# Patient Record
Sex: FEMALE | Race: BLACK OR AFRICAN AMERICAN | NOT HISPANIC OR LATINO | Employment: PART TIME | URBAN - METROPOLITAN AREA
[De-identification: names, ages, dates, MRNs, and addresses within clinical notes are randomized per-mention and may not be internally consistent; named-entity substitution may affect disease eponyms.]

---

## 2024-09-09 ENCOUNTER — HOSPITAL ENCOUNTER (EMERGENCY)
Facility: HOSPITAL | Age: 49
Discharge: HOME | End: 2024-09-09
Attending: EMERGENCY MEDICINE | Admitting: EMERGENCY MEDICINE
Payer: MEDICAID

## 2024-09-09 VITALS
WEIGHT: 126 LBS | BODY MASS INDEX: 23.79 KG/M2 | HEART RATE: 75 BPM | OXYGEN SATURATION: 97 % | SYSTOLIC BLOOD PRESSURE: 146 MMHG | RESPIRATION RATE: 18 BRPM | HEIGHT: 61 IN | TEMPERATURE: 98 F | DIASTOLIC BLOOD PRESSURE: 99 MMHG

## 2024-09-09 DIAGNOSIS — R25.2 BILATERAL LEG CRAMPS: Primary | ICD-10-CM

## 2024-09-09 DIAGNOSIS — E86.0 MILD DEHYDRATION: ICD-10-CM

## 2024-09-09 LAB
ALBUMIN SERPL-MCNC: 4.5 G/DL (ref 3.5–5.2)
ALBUMIN/GLOB SERPL: 1.2 G/DL
ALP SERPL-CCNC: 52 U/L (ref 39–117)
ALT SERPL W P-5'-P-CCNC: 24 U/L (ref 1–33)
ANION GAP SERPL CALCULATED.3IONS-SCNC: 12.4 MMOL/L (ref 5–15)
AST SERPL-CCNC: 52 U/L (ref 1–32)
BASOPHILS # BLD AUTO: 0.01 10*3/MM3 (ref 0–0.2)
BASOPHILS NFR BLD AUTO: 0.2 % (ref 0–1.5)
BILIRUB SERPL-MCNC: 0.3 MG/DL (ref 0–1.2)
BUN SERPL-MCNC: 22 MG/DL (ref 6–20)
BUN/CREAT SERPL: 28.2 (ref 7–25)
CALCIUM SPEC-SCNC: 9.9 MG/DL (ref 8.6–10.5)
CHLORIDE SERPL-SCNC: 97 MMOL/L (ref 98–107)
CO2 SERPL-SCNC: 25.6 MMOL/L (ref 22–29)
CREAT SERPL-MCNC: 0.78 MG/DL (ref 0.57–1)
DEPRECATED RDW RBC AUTO: 39.1 FL (ref 37–54)
EGFRCR SERPLBLD CKD-EPI 2021: 93.2 ML/MIN/1.73
EOSINOPHIL # BLD AUTO: 0.09 10*3/MM3 (ref 0–0.4)
EOSINOPHIL NFR BLD AUTO: 1.6 % (ref 0.3–6.2)
ERYTHROCYTE [DISTWIDTH] IN BLOOD BY AUTOMATED COUNT: 12.4 % (ref 12.3–15.4)
GLOBULIN UR ELPH-MCNC: 3.8 GM/DL
GLUCOSE SERPL-MCNC: 103 MG/DL (ref 65–99)
HCT VFR BLD AUTO: 42.5 % (ref 34–46.6)
HGB BLD-MCNC: 13.7 G/DL (ref 12–15.9)
IMM GRANULOCYTES # BLD AUTO: 0.01 10*3/MM3 (ref 0–0.05)
IMM GRANULOCYTES NFR BLD AUTO: 0.2 % (ref 0–0.5)
LYMPHOCYTES # BLD AUTO: 1.84 10*3/MM3 (ref 0.7–3.1)
LYMPHOCYTES NFR BLD AUTO: 32.7 % (ref 19.6–45.3)
MAGNESIUM SERPL-MCNC: 1.6 MG/DL (ref 1.6–2.6)
MCH RBC QN AUTO: 27.5 PG (ref 26.6–33)
MCHC RBC AUTO-ENTMCNC: 32.2 G/DL (ref 31.5–35.7)
MCV RBC AUTO: 85.2 FL (ref 79–97)
MONOCYTES # BLD AUTO: 0.49 10*3/MM3 (ref 0.1–0.9)
MONOCYTES NFR BLD AUTO: 8.7 % (ref 5–12)
NEUTROPHILS NFR BLD AUTO: 3.18 10*3/MM3 (ref 1.7–7)
NEUTROPHILS NFR BLD AUTO: 56.6 % (ref 42.7–76)
PLATELET # BLD AUTO: 246 10*3/MM3 (ref 140–450)
PMV BLD AUTO: 10 FL (ref 6–12)
POTASSIUM SERPL-SCNC: 3.5 MMOL/L (ref 3.5–5.2)
PROT SERPL-MCNC: 8.3 G/DL (ref 6–8.5)
RBC # BLD AUTO: 4.99 10*6/MM3 (ref 3.77–5.28)
SODIUM SERPL-SCNC: 135 MMOL/L (ref 136–145)
WBC NRBC COR # BLD AUTO: 5.62 10*3/MM3 (ref 3.4–10.8)

## 2024-09-09 PROCEDURE — 85025 COMPLETE CBC W/AUTO DIFF WBC: CPT | Performed by: NURSE PRACTITIONER

## 2024-09-09 PROCEDURE — 80053 COMPREHEN METABOLIC PANEL: CPT | Performed by: NURSE PRACTITIONER

## 2024-09-09 PROCEDURE — 99283 EMERGENCY DEPT VISIT LOW MDM: CPT | Performed by: EMERGENCY MEDICINE

## 2024-09-09 PROCEDURE — 83735 ASSAY OF MAGNESIUM: CPT | Performed by: NURSE PRACTITIONER

## 2024-09-09 RX ORDER — NAPROXEN 250 MG/1
500 TABLET ORAL ONCE
Status: COMPLETED | OUTPATIENT
Start: 2024-09-09 | End: 2024-09-09

## 2024-09-09 RX ORDER — SODIUM CHLORIDE 1 G/1
1 TABLET ORAL ONCE
Status: COMPLETED | OUTPATIENT
Start: 2024-09-09 | End: 2024-09-09

## 2024-09-09 RX ORDER — POTASSIUM CHLORIDE 1500 MG/1
20 TABLET, EXTENDED RELEASE ORAL ONCE
Status: COMPLETED | OUTPATIENT
Start: 2024-09-09 | End: 2024-09-09

## 2024-09-09 RX ADMIN — Medication 400 MG: at 14:07

## 2024-09-09 RX ADMIN — POTASSIUM CHLORIDE 20 MEQ: 1500 TABLET, EXTENDED RELEASE ORAL at 14:07

## 2024-09-09 RX ADMIN — Medication 1 G: at 14:07

## 2024-09-09 RX ADMIN — NAPROXEN 500 MG: 250 TABLET ORAL at 14:07

## 2024-09-09 NOTE — DISCHARGE INSTRUCTIONS
Rest and increase fluids.  Add a electrolyte drink to your daily routine to improve your electrolyte balance.  Follow-up with your primary care.  Return to the emergency department if symptoms get worse or change.  You may take Tylenol or Motrin for your leg pain.  Tylenol is 1000 mg every 6 hours and the ibuprofen/Motrin is 3 tablets by mouth every 6 hours as needed.

## 2024-09-09 NOTE — ED PROVIDER NOTES
Subjective   History of Present Illness  49-year-old -American female patient presented to the emergency department via private vehicle with a chief complaint of bilateral leg pain.  Patient states her symptoms started 3 to 4 days ago and has not improved.  She states she went to urgent care today and they sent her to the emergency department.  Patient states she has had no acute injuries or changes that she knows of.  She states the pain is located on the sides and backs of her legs.  She states she does stand on her feet many hours a day working at [employer].  Patient states she has not taken any medications for her symptoms at home.        Review of Systems   Constitutional: Negative.    HENT: Negative.     Respiratory: Negative.     Cardiovascular: Negative.    Gastrointestinal: Negative.    Genitourinary: Negative.    Musculoskeletal:  Positive for arthralgias and myalgias. Negative for back pain, gait problem, joint swelling, neck pain and neck stiffness.   Skin: Negative.    Neurological: Negative.    Psychiatric/Behavioral: Negative.     All other systems reviewed and are negative.      History reviewed. No pertinent past medical history.    Allergies   Allergen Reactions    Lisinopril Other (See Comments)     Limp limbs, cannot use arms    Octacosanol Unknown - High Severity    Latex Hives    Penicillins Rash       History reviewed. No pertinent surgical history.    History reviewed. No pertinent family history.    Social History     Socioeconomic History    Marital status: Single   Tobacco Use    Smoking status: Never    Smokeless tobacco: Never   Vaping Use    Vaping status: Never Used   Substance and Sexual Activity    Alcohol use: Never    Drug use: Never    Sexual activity: Never           Objective   Physical Exam  Vitals and nursing note reviewed.   Constitutional:       Appearance: Normal appearance. She is normal weight.   HENT:      Head: Normocephalic and atraumatic.      Right Ear: External ear  normal.      Left Ear: External ear normal.      Nose: Nose normal.      Mouth/Throat:      Mouth: Mucous membranes are moist.      Pharynx: Oropharynx is clear.   Eyes:      Extraocular Movements: Extraocular movements intact.      Conjunctiva/sclera: Conjunctivae normal.      Pupils: Pupils are equal, round, and reactive to light.   Cardiovascular:      Rate and Rhythm: Normal rate and regular rhythm.      Pulses: Normal pulses.      Heart sounds: Normal heart sounds.   Pulmonary:      Effort: Pulmonary effort is normal.      Breath sounds: Normal breath sounds.   Abdominal:      General: Bowel sounds are normal.      Palpations: Abdomen is soft.   Musculoskeletal:         General: Tenderness present. Normal range of motion.      Cervical back: Normal range of motion and neck supple.      Right lower leg: No edema.      Left lower leg: No edema.      Comments: Tenderness in multiple areas of upper and lower legs.   Skin:     General: Skin is warm and dry.      Capillary Refill: Capillary refill takes less than 2 seconds.   Neurological:      General: No focal deficit present.      Mental Status: She is alert and oriented to person, place, and time.   Psychiatric:         Mood and Affect: Mood normal.         Behavior: Behavior normal.         Thought Content: Thought content normal.         Judgment: Judgment normal.         Procedures           ED Course                                             Medical Decision Making  Differential diagnosis includes myalgia, sprain, strain, electrolyte abnormalities, and soft tissue injuries.    No radiology results for the last day    Results for orders placed or performed during the hospital encounter of 09/09/24  -CBC Auto Differential:   Specimen: Blood       Result                      Value             Ref Range           WBC                         5.62              3.40 - 10.80*       RBC                         4.99              3.77 - 5.28 *       Hemoglobin                   13.7              12.0 - 15.9 *       Hematocrit                  42.5              34.0 - 46.6 %       MCV                         85.2              79.0 - 97.0 *       MCH                         27.5              26.6 - 33.0 *       MCHC                        32.2              31.5 - 35.7 *       RDW                         12.4              12.3 - 15.4 %       RDW-SD                      39.1              37.0 - 54.0 *       MPV                         10.0              6.0 - 12.0 fL       Platelets                   246               140 - 450 10*       Neutrophil %                56.6              42.7 - 76.0 %       Lymphocyte %                32.7              19.6 - 45.3 %       Monocyte %                  8.7               5.0 - 12.0 %        Eosinophil %                1.6               0.3 - 6.2 %         Basophil %                  0.2               0.0 - 1.5 %         Immature Grans %            0.2               0.0 - 0.5 %         Neutrophils, Absolute       3.18              1.70 - 7.00 *       Lymphocytes, Absolute       1.84              0.70 - 3.10 *       Monocytes, Absolute         0.49              0.10 - 0.90 *       Eosinophils, Absolute       0.09              0.00 - 0.40 *       Basophils, Absolute         0.01              0.00 - 0.20 *       Immature Grans, Absolu*     0.01              0.00 - 0.05 *  -Magnesium:   Specimen: Blood       Result                      Value             Ref Range           Magnesium                   1.6               1.6 - 2.6 mg*  -Comprehensive Metabolic Panel:   Specimen: Blood       Result                      Value             Ref Range           Glucose                     103 (H)           65 - 99 mg/dL       BUN                         22 (H)            6 - 20 mg/dL        Creatinine                  0.78              0.57 - 1.00 *       Sodium                      135 (L)           136 - 145 mm*       Potassium                   3.5                3.5 - 5.2 mm*       Chloride                    97 (L)            98 - 107 mmo*       CO2                         25.6              22.0 - 29.0 *       Calcium                     9.9               8.6 - 10.5 m*       Total Protein               8.3               6.0 - 8.5 g/*       Albumin                     4.5               3.5 - 5.2 g/*       ALT (SGPT)                  24                1 - 33 U/L          AST (SGOT)                  52 (H)            1 - 32 U/L          Alkaline Phosphatase        52                39 - 117 U/L        Total Bilirubin             0.3               0.0 - 1.2 mg*       Globulin                    3.8               gm/dL               A/G Ratio                   1.2               g/dL                BUN/Creatinine Ratio        28.2 (H)          7.0 - 25.0          Anion Gap                   12.4              5.0 - 15.0 m*       eGFR                        93.2              >60.0 mL/min*    Discussed laboratory and assessment findings.  Patient appears to be having leg cramps secondary to some mild dehydration.  We will replace her electrolytes with oral supplementation.  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  She should increase fluids and rest.  Patient should follow-up with her PCP.  Patient should return to the emergency department if symptoms get worse or change.  Patient was instructed to use Tylenol or Motrin for her leg pain.  Patient understands and agrees to discharge plan.    Problems Addressed:  Bilateral leg cramps: acute illness or injury  Mild dehydration: acute illness or injury    Amount and/or Complexity of Data Reviewed  Labs: ordered. Decision-making details documented in ED Course.    Risk  OTC drugs.  Prescription drug management.        Final diagnoses:   Bilateral leg cramps   Mild dehydration       ED Disposition  ED Disposition       ED Disposition   Discharge    Condition    Stable    Comment   --               Ashanti Mckeon MD  [address] [facility]  [address] [place] [address]  [phone]    Schedule an appointment as soon as possible for a visit in 3 days      [facility] EMERGENCY DEPARTMENT  [address] [facility] [address] [phone]    If symptoms worsen         Medication List      No changes were made to your prescriptions during this visit.            Andrey Kelley, APRN  09/09/24 1405

## 2024-09-09 NOTE — Clinical Note
CL GILMA SOUSA  [facility] EMERGENCY DEPARTMENT  [address] [place] [address]  Phone: [phone]    Linda Rutherford was seen and treated in our emergency department on 9/9/2024.  She may return to work on 09/11/2024.         Thank you for choosing [facility].    Andrey Kelley, AI